# Patient Record
Sex: MALE | Race: WHITE | URBAN - METROPOLITAN AREA
[De-identification: names, ages, dates, MRNs, and addresses within clinical notes are randomized per-mention and may not be internally consistent; named-entity substitution may affect disease eponyms.]

---

## 2021-03-11 DIAGNOSIS — Z31.41 ENCOUNTER FOR SPERM COUNT FOR FERTILITY TESTING: Primary | ICD-10-CM

## 2021-04-08 DIAGNOSIS — Z31.41 ENCOUNTER FOR SPERM COUNT FOR FERTILITY TESTING: ICD-10-CM

## 2021-04-08 PROCEDURE — 89322 SEMEN ANAL STRICT CRITERIA: CPT

## 2021-04-09 LAB
ABNORMAL SPERM: 99 MORPHOLOGY
ABSTINENCE DAYS: 6 DAYS (ref 2–7)
AGGLUTINATION: NO YES/NO
ANALYSIS TEMP - CENTIGRADE: 22.5 CENTIGRADE
CELL FRAGMENTS: ABNORMAL %
COLLECTION METHOD: ABNORMAL
COLLECTION SITE: ABNORMAL
CONSENT TO RELEASE TO PARTNER: YES
HEAD DEFECT: 99
IMMATURE SPERM: ABNORMAL %
IMMOTILE: 60 %
LAB RECEIPT TIME: ABNORMAL
LIQUEFIED: YES YES/NO
MIDPIECE DEFECT: 46
NON-PROGRESSIVE MOTILITY: 1 %
NORMAL SPERM: 1 % NORMAL FORMS (ref 4–?)
PROGRESSIVE MOTILITY: 39 % (ref 32–?)
ROUND CELLS: <0.1 MILLION/ML (ref ?–2)
SPECIMEN CONCENTRATION: 10 MILLION/ML (ref 15–?)
SPECIMEN PH: 7.2 PH (ref 7.2–?)
SPECIMEN TYPE: ABNORMAL
SPECIMEN VOL UR: 4 ML (ref 1.5–?)
TAIL DEFECT: 11
TIME OF ANALYSIS: ABNORMAL
TOTAL NUMBER: 40 MILLION (ref 39–?)
TOTAL PROGRESSIVE MOTILE: 16 MILLION (ref 15.6–?)
VISCOUS: NO YES/NO
VITALITY: 41 % (ref 58–?)
WBC SPECIMEN: ABNORMAL %

## 2024-05-01 ENCOUNTER — OFFICE VISIT (OUTPATIENT)
Dept: OPTOMETRY | Facility: CLINIC | Age: 39
End: 2024-05-01
Payer: COMMERCIAL

## 2024-05-01 DIAGNOSIS — H11.002 PTERYGIUM OF LEFT EYE: Primary | ICD-10-CM

## 2024-05-01 DIAGNOSIS — H11.153 PINGUECULA OF BOTH EYES: ICD-10-CM

## 2024-05-01 PROCEDURE — 99203 OFFICE O/P NEW LOW 30 MIN: CPT | Performed by: OPTOMETRIST

## 2024-05-01 ASSESSMENT — TONOMETRY
OS_IOP_MMHG: 13
IOP_METHOD: APPLANATION
OD_IOP_MMHG: 13

## 2024-05-01 ASSESSMENT — SLIT LAMP EXAM - LIDS
COMMENTS: NORMAL
COMMENTS: NORMAL

## 2024-05-01 ASSESSMENT — VISUAL ACUITY
OS_SC: 20/20
METHOD: SNELLEN - LINEAR
OD_SC: 20/20

## 2024-05-01 NOTE — LETTER
5/1/2024         RE: Carson Rossi  [address]   [place] [address]        Dear Colleague,    Thank you for referring your patient, Carson Rossi, to the [facility]. Please see a copy of my visit note below.    Chief Complaint(s) and History of Present Illness(es)       Eye Problem Left Eye              Laterality: left eye    Onset: 2 years ago    Quality: blurred vision    Frequency: constantly    Associated symptoms: dryness, eye pain, redness, photophobia and foreign body sensation    Treatments tried: eye drops (Cleareyes 1-2 times a day )    Response to treatment: mild improvement    Pain scale: 0/10    Comments: Patient states he has had this growth on his eye for about 2 years. It is sometimes more bothersome and  painful. It mostly feels like there is something in his eye and his eye  is red and irritated. He has used Clear eyes  -gives him some relief, but never for very long.   He does not wear glasses or contacts, but mentioned that he does wear sunglasses due to light sensitivity                             Carrie Servin Optometric Assistant      Review of Symptoms    Review of Symptoms    EYES: See HPI  CONSTITUTIONAL: recent concussion causes right eye to be blurry          Medical, surgical and family histories reviewed and updated 5/1/2024.         OBJECTIVE: See Ophthalmology exam    ASSESSMENT:    ICD-10-CM    1. Pterygium of left eye  H11.002 Adult Eye  Referral      2. Pinguecula of both eyes  H11.153 Adult Eye  Referral         PLAN:    Patient Instructions   Use over the counter artificial tears 3-4  times a day ( Thera Tears, , Systane Ultra or Systane Complete )  Avoid use of Clear eyes   Keep wearing sunglasses    Refer to corneal specialist for pterygium removal       Janell Shaw, OD  [phone]                     Again, thank you for allowing me to participate in the care of your patient.        Sincerely,        Janell Shaw, OD

## 2024-05-01 NOTE — PATIENT INSTRUCTIONS
Use over the counter artificial tears 3-4  times a day ( Thera Tears, , Systane Ultra or Systane Complete )  Avoid use of Clear eyes   Keep wearing sunglasses    Refer to corneal specialist for pterygium removal       Janell Shaw, OD  [phone]

## 2024-05-01 NOTE — PROGRESS NOTES
Chief Complaint(s) and History of Present Illness(es)       Eye Problem Left Eye              Laterality: left eye    Onset: 2 years ago    Quality: blurred vision    Frequency: constantly    Associated symptoms: dryness, eye pain, redness, photophobia and foreign body sensation    Treatments tried: eye drops (Cleareyes 1-2 times a day )    Response to treatment: mild improvement    Pain scale: 0/10    Comments: Patient states he has had this growth on his eye for about 2 years. It is sometimes more bothersome and  painful. It mostly feels like there is something in his eye and his eye  is red and irritated. He has used Clear eyes  -gives him some relief, but never for very long.   He does not wear glasses or contacts, but mentioned that he does wear sunglasses due to light sensitivity                             Carrie Servin Optometric Assistant      Review of Symptoms    Review of Symptoms    EYES: See HPI  CONSTITUTIONAL: recent concussion causes right eye to be blurry          Medical, surgical and family histories reviewed and updated 5/1/2024.         OBJECTIVE: See Ophthalmology exam    ASSESSMENT:    ICD-10-CM    1. Pterygium of left eye  H11.002 Adult Eye  Referral      2. Pinguecula of both eyes  H11.153 Adult Eye  Referral         PLAN:    Patient Instructions   Use over the counter artificial tears 3-4  times a day ( Thera Tears, , Systane Ultra or Systane Complete )  Avoid use of Clear eyes   Keep wearing sunglasses    Refer to corneal specialist for pterygium removal       Janell Shaw, OD  [phone]